# Patient Record
Sex: MALE | Race: WHITE
[De-identification: names, ages, dates, MRNs, and addresses within clinical notes are randomized per-mention and may not be internally consistent; named-entity substitution may affect disease eponyms.]

---

## 2018-03-14 ENCOUNTER — HOSPITAL ENCOUNTER (EMERGENCY)
Dept: HOSPITAL 46 - ED | Age: 58
Discharge: HOME | End: 2018-03-14
Payer: COMMERCIAL

## 2018-03-14 VITALS — HEIGHT: 74 IN | BODY MASS INDEX: 32.37 KG/M2 | WEIGHT: 252.25 LBS

## 2018-03-14 DIAGNOSIS — E78.00: ICD-10-CM

## 2018-03-14 DIAGNOSIS — R07.2: Primary | ICD-10-CM

## 2018-03-14 DIAGNOSIS — Z88.5: ICD-10-CM

## 2018-03-14 DIAGNOSIS — E78.5: ICD-10-CM

## 2018-03-14 DIAGNOSIS — I10: ICD-10-CM

## 2018-03-14 DIAGNOSIS — Z79.82: ICD-10-CM

## 2018-03-14 DIAGNOSIS — Z79.899: ICD-10-CM

## 2018-03-14 DIAGNOSIS — Z88.8: ICD-10-CM

## 2018-03-14 NOTE — XMS
Clinical Summary
  Created on: 2018
 
 Mart Mishra
 External Reference #: [id number]
 : 60
 Sex: Male
 
 Demographics
 
 
+-----------------------+------------------------+
| Address               | [address]         |
|                       | [place]  [address]   |
+-----------------------+------------------------+
| Home Phone            | [phone]        |
+-----------------------+------------------------+
| Preferred Language    | Unknown                |
+-----------------------+------------------------+
| Marital Status        |                 |
+-----------------------+------------------------+
| Buddhist Affiliation | Unknown                |
+-----------------------+------------------------+
| Race                  | White                  |
+-----------------------+------------------------+
| Ethnic Group          | Not  or  |
+-----------------------+------------------------+
 
 
 Author
 
 
+--------------+----------------------+
| Author       | [facility] |
+--------------+----------------------+
| Organization | [facility] |
+--------------+----------------------+
| Address      | Unknown              |
+--------------+----------------------+
| Phone        | Unavailable          |
+--------------+----------------------+
 
 
 
 Support
 
 
+----------------+--------------+---------+-----------------+
| Name           | Relationship | Address | Phone           |
+----------------+--------------+---------+-----------------+
| JENNIFER MISHRA | ECON         | Unknown | [phone] |
+----------------+--------------+---------+-----------------+
 
 
 
 Care Team Providers
 
 
 
+-----------------------+------+-------------+
| Care Team Member Name | Role | Phone       |
+-----------------------+------+-------------+
 PP   | Unavailable |
+-----------------------+------+-------------+
 
 
 
 Source Comments
 ABBIE is fully live on both [facility] and [facility]
 
 Allergies
 No Known Allergies
 
 Current Medications
 
 
+----------------------+---------------------+-------+---------+------+------+-------+
| Prescription         | Sig.                | Disp. | Refills | Star | End  | Statu |
|                      |                     |       |         | t    | Date | s     |
|                      |                     |       |         | Date |      |       |
+----------------------+---------------------+-------+---------+------+------+-------+
|   MULTI-VITAMIN OR   | None Entered        |       |         |      |      | Activ |
|                      |                     |       |         |      |      | e     |
+----------------------+---------------------+-------+---------+------+------+-------+
|   VITAMIN C OR       | None Entered        |       |         |      |      | Activ |
|                      |                     |       |         |      |      | e     |
+----------------------+---------------------+-------+---------+------+------+-------+
|   cephALEXin         | Take 4 capsules by  |   4   | 0       |  |      | Activ |
| (KEFLEX) 500 mg Oral | mouth now           |       |         |  |      | e     |
|  Capsule             |                     |       |         | 08   |      |       |
+----------------------+---------------------+-------+---------+------+------+-------+
 
 
 
 Active Problems
 
 
+----------------------------+------------+
| Problem                    | Noted Date |
+----------------------------+------------+
| Malignant neoplasm of skin |            |
+----------------------------+------------+
 
 
 
+---------------------+
|   Overview:   ICD10 |
+---------------------+
 
 
 
 Social History
 
 
+--------------+-------+-----------+--------+------+
| Tobacco Use  | Types | Packs/Day | Years  | Date |
|              |       |           | Used   |      |
+--------------+-------+-----------+--------+------+
 
| Never Smoker |       |           |        |      |
+--------------+-------+-----------+--------+------+
 
 
 
+------------------+---------------+
| Sex Assigned at  | Date Recorded |
| Birth            |               |
+------------------+---------------+
| Not on file      |               |
+------------------+---------------+
 
 
 
 Last Filed Vital Signs
 
 
+-------------------+---------+-------------------------+
| Vital Sign        | Reading | Time Taken              |
+-------------------+---------+-------------------------+
| Blood Pressure    | 120/80  | 2008  8:01 AM PST |
+-------------------+---------+-------------------------+
| Pulse             | 72      | 2008  8:01 AM PST |
+-------------------+---------+-------------------------+
| Temperature       | -       | -                       |
+-------------------+---------+-------------------------+
| Respiratory Rate  | 16      | 2008  8:01 AM PST |
+-------------------+---------+-------------------------+
| Oxygen Saturation | -       | -                       |
+-------------------+---------+-------------------------+
| Inhaled Oxygen    | -       | -                       |
| Concentration     |         |                         |
+-------------------+---------+-------------------------+
| Weight            | -       | -                       |
+-------------------+---------+-------------------------+
| Height            | -       | -                       |
+-------------------+---------+-------------------------+
| Body Mass Index   | -       | -                       |
+-------------------+---------+-------------------------+
 
 
 
 Plan of Treatment
 
 
+--------------------+-----------+-----------+----------+
| Health Maintenance | Due Date  | Last Done | Comments |
+--------------------+-----------+-----------+----------+
| INFLUENZA VACCINE  |  |           |          |
| (FLU SHOT)         | 7         |           |          |
+--------------------+-----------+-----------+----------+
 
 
 
 Results
 Not on filefrom Last 3 Months

## 2018-03-14 NOTE — EKG
[facility]
                                    [address]
                                  [place]  [address]
_________________________________________________________________________________________
                                                                 Signed   
 
 
Normal sinus rhythm
Normal ECG
No previous ECGs available
Confirmed by BERTRAND BRIGHT MD ([id number]) on 3/14/2018 6:33:18 PM
 
 
 
 
 
 
 
 
 
 
 
 
 
 
 
 
 
 
 
 
 
 
 
 
 
 
 
 
 
 
 
 
 
 
 
 
 
 
 
 
 
    Electronically Signed By: BERTRAND BRIGHT MD  03/14/18 1833
_________________________________________________________________________________________
PATIENT NAME:     UMESH MISHRA                 
MEDICAL RECORD #: [id number]                     Electrocardiogram             
          ACCT #: [id number]  
DATE OF BIRTH:   [birth year]                                       
PHYSICIAN:   BERTRAND BRIGHT MD           REPORT #: [id number]
REPORT IS CONFIDENTIAL AND NOT TO BE RELEASED WITHOUT AUTHORIZATION

## 2018-03-14 NOTE — XMS
Clinical Summary
  Created on: 2018
 
 Mart Mishra
 External Reference #: [id number]
 : 60
 Sex: Male
 
 Demographics
 
 
+-----------------------+------------------------+
| Address               | [address]         |
|                       | [place]  [address]   |
+-----------------------+------------------------+
| Home Phone            | [phone]        |
+-----------------------+------------------------+
| Preferred Language    | Unknown                |
+-----------------------+------------------------+
| Marital Status        |                 |
+-----------------------+------------------------+
| [employer] Affiliation | Unknown                |
+-----------------------+------------------------+
| Race                  | White                  |
+-----------------------+------------------------+
| Ethnic Group          | Not  or  |
+-----------------------+------------------------+
 
 
 Author
 
 
+--------------+----------------------+
| Author       | [facility] |
+--------------+----------------------+
| Organization | [facility] |
+--------------+----------------------+
| Address      | Unknown              |
+--------------+----------------------+
| Phone        | Unavailable          |
+--------------+----------------------+
 
 
 
 Support
 
 
+----------------+--------------+---------+-----------------+
| Name           | Relationship | Address | Phone           |
+----------------+--------------+---------+-----------------+
| JENNIFER MISHRA | ECON         | Unknown | [phone] |
+----------------+--------------+---------+-----------------+
 
 
 
 Care Team Providers
 
 
 
+-----------------------+------+-------------+
| Care Team Member Name | Role | Phone       |
+-----------------------+------+-------------+
 PP   | Unavailable |
+-----------------------+------+-------------+
 
 
 
 Source Comments
 ABBIE is fully live on both [facility] and [facility]
 
 Allergies
 No Known Allergies
 
 Current Medications
 
 
+----------------------+---------------------+-------+---------+------+------+-------+
| Prescription         | Sig.                | Disp. | Refills | Star | End  | Statu |
|                      |                     |       |         | t    | Date | s     |
|                      |                     |       |         | Date |      |       |
+----------------------+---------------------+-------+---------+------+------+-------+
|   MULTI-VITAMIN OR   | None Entered        |       |         |      |      | Activ |
|                      |                     |       |         |      |      | e     |
+----------------------+---------------------+-------+---------+------+------+-------+
|   VITAMIN C OR       | None Entered        |       |         |      |      | Activ |
|                      |                     |       |         |      |      | e     |
+----------------------+---------------------+-------+---------+------+------+-------+
|   cephALEXin         | Take 4 capsules by  |   4   | 0       |  |      | Activ |
| (KEFLEX) 500 mg Oral | mouth now           |       |         |  |      | e     |
|  Capsule             |                     |       |         | 08   |      |       |
+----------------------+---------------------+-------+---------+------+------+-------+
 
 
 
 Active Problems
 
 
+----------------------------+------------+
| Problem                    | Noted Date |
+----------------------------+------------+
| Malignant neoplasm of skin |            |
+----------------------------+------------+
 
 
 
+---------------------+
|   Overview:   ICD10 |
+---------------------+
 
 
 
 Social History
 
 
+--------------+-------+-----------+--------+------+
| Tobacco Use  | Types | Packs/Day | Years  | Date |
|              |       |           | Used   |      |
+--------------+-------+-----------+--------+------+
 
| Never Smoker |       |           |        |      |
+--------------+-------+-----------+--------+------+
 
 
 
+------------------+---------------+
| Sex Assigned at  | Date Recorded |
| Birth            |               |
+------------------+---------------+
| Not on file      |               |
+------------------+---------------+
 
 
 
 Last Filed Vital Signs
 
 
+-------------------+---------+-------------------------+
| Vital Sign        | Reading | Time Taken              |
+-------------------+---------+-------------------------+
| Blood Pressure    | 120/80  | 2008  8:01 AM PST |
+-------------------+---------+-------------------------+
| Pulse             | 72      | 2008  8:01 AM PST |
+-------------------+---------+-------------------------+
| Temperature       | -       | -                       |
+-------------------+---------+-------------------------+
| Respiratory Rate  | 16      | 2008  8:01 AM PST |
+-------------------+---------+-------------------------+
| Oxygen Saturation | -       | -                       |
+-------------------+---------+-------------------------+
| Inhaled Oxygen    | -       | -                       |
| Concentration     |         |                         |
+-------------------+---------+-------------------------+
| Weight            | -       | -                       |
+-------------------+---------+-------------------------+
| Height            | -       | -                       |
+-------------------+---------+-------------------------+
| Body Mass Index   | -       | -                       |
+-------------------+---------+-------------------------+
 
 
 
 Plan of Treatment
 
 
+--------------------+-----------+-----------+----------+
| Health Maintenance | Due Date  | Last Done | Comments |
+--------------------+-----------+-----------+----------+
| INFLUENZA VACCINE  |  |           |          |
| (FLU SHOT)         | 7         |           |          |
+--------------------+-----------+-----------+----------+
 
 
 
 Results
 Not on filefrom Last 3 Months

## 2019-11-26 NOTE — OR
[facility]
                                    [address], [place]  [address]
_________________________________________________________________________________________
                                                                 Signed   
 
 
DATE OF OPERATION:
11/26/2019
 
SURGEON:
Ngozi Quintero MD
 
PREOPERATIVE DIAGNOSES:
1. Screening.
2. Diverticulosis.
3. Internal hemorrhoids.
4. External hemorrhoids.
 
POSTOPERATIVE DIAGNOSIS:
Mild-to-moderate pan-diverticulosis.
 
PROCEDURE:
Colonoscopy without biopsy.
 
ESTIMATED BLOOD LOSS:
None.
 
INDICATIONS:
Mart is a 59-year-old gentleman, who is here for followup screening colonoscopy.  He
had a previous sigmoidoscopy and barium enema back in 1992.  It did show diverticulosis.
 He had a colonoscopy in 2009 and again, he had left-sided diverticulosis along with
internal and external hemorrhoids.  He then had hemorrhoid surgery about a month later.
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
for something to do.  He said he has no lower GI complaints.  No family history of colon
cancer or polyps.  In the office, I gave Mart a pamphlet on colonoscopy.  We looked at
that together along with the risks including, but not limited to gas, bloating, crampy
abdominal pain, bleeding, perforation requiring surgery, and missed diagnosis.  He also
recalls the need for IV conscious sedation.  He expressed understanding and wished to
proceed. 
 
PROCEDURE NOTE:
Mart was taken into our endoscopy suite and placed in the left lateral decubitus
position.  He was given a total of 6 mg of Versed and 125 mcg of fentanyl to cover the
case.  A digital rectal exam was performed and this was unremarkable.  No external
hemorrhoids.  He had good sphincter tone.  Little bit induration to the prostate.  The
adult colonoscope was introduced and advanced all around into the cecum under direct
visualization of camera without difficulty.  His prep was good.  The scope was slowly
withdrawn.  We could easily see the appendiceal orifice and the ileocecal valve.  We had
 
    Electronically Signed By: NGOZI QUINTERO MD  11/26/19 1658
_________________________________________________________________________________________
PATIENT NAME:     MART MISHRA                 
MEDICAL RECORD #: [id number]            OPERATIVE REPORT              
          ACCT #: [id number]  
DATE OF BIRTH:   [birth year]            REPORT #: [id number]      
PHYSICIAN:        NGOZI QUINTERO MD             
PCP:              ABIDA MONTEZ PA-C         
REPORT IS CONFIDENTIAL AND NOT TO BE RELEASED WITHOUT AUTHORIZATION
 
 
                                  [facility]
                                    [address], [place]  [address]
_________________________________________________________________________________________
                                                                 Signed   
 
 
taken pictures for photodocumentation.  He does have a few diverticula on the right and
also on the left.  They were minimal-to-moderate in size, minimal-to-moderate in number,
and scattered about.  The rectum was unremarkable.  Upon retroflexion of the scope, he
has a little bit of scar tissue from his previous hemorrhoid tissue.  I do not see any
evidence of any significant internal hemorrhoid tissue.  After this, the gas was
suctioned out and the colonoscope removed.  Mart tolerated the procedure quite well. 
 
RECOMMENDATIONS:
Mart can return in 10 years for repeat screening colonoscopy.
 
 
 
            ________________________________________
            MD TRINA Gan/BILL
Job #:  [id number]
DD:  11/26/2019 07:56:17
DT:  11/26/2019 13:22:31
 
cc:            DEVON Chavez MD
 
 
Copies:  NGOZI QUINTERO MD
~
 
 
 
 
 
 
 
 
 
 
 
 
 
 
 
    Electronically Signed By: NGOZI QUINTERO MD  11/26/19 1658
_________________________________________________________________________________________
PATIENT NAME:     MART MISHRA                 
MEDICAL RECORD #: [id number]            OPERATIVE REPORT              
          ACCT #: [id number]  
DATE OF BIRTH:   [birth year]            REPORT #: [id number]      
PHYSICIAN:        NGOZI QUINTERO MD             
PCP:              ABIDA MONTEZ PA-C         
REPORT IS CONFIDENTIAL AND NOT TO BE RELEASED WITHOUT AUTHORIZATION

## 2019-11-26 NOTE — NUR
PT ALERT, ORIENTED AND SUPPORTED BY PTS' WIFE DIOR. PT TOLERATED PREP MUCH
BETTER HE SAID THAN THE LAST SCOPE. PT SEEMED READY, HAD FEW QUESTIONS. PT
REQUESTED PRAYER, WILL FOLLOW AS NEEDED

## 2019-11-26 NOTE — NUR
[facility] ARRIVED IN PACU SLEEPY WITH NO C/O'S. 0800 PASSING FLATUS.
ABD SOFT.